# Patient Record
Sex: MALE | URBAN - METROPOLITAN AREA
[De-identification: names, ages, dates, MRNs, and addresses within clinical notes are randomized per-mention and may not be internally consistent; named-entity substitution may affect disease eponyms.]

---

## 2020-10-28 ENCOUNTER — APPOINTMENT (OUTPATIENT)
Dept: URBAN - METROPOLITAN AREA CLINIC 252 | Age: 50
Setting detail: DERMATOLOGY
End: 2020-10-29

## 2020-10-28 VITALS — WEIGHT: 270 LBS | HEIGHT: 72 IN | RESPIRATION RATE: 16 BRPM

## 2020-10-28 DIAGNOSIS — L57.0 ACTINIC KERATOSIS: ICD-10-CM

## 2020-10-28 DIAGNOSIS — L28.1 PRURIGO NODULARIS: ICD-10-CM

## 2020-10-28 DIAGNOSIS — D22 MELANOCYTIC NEVI: ICD-10-CM

## 2020-10-28 PROBLEM — D22.61 MELANOCYTIC NEVI OF RIGHT UPPER LIMB, INCLUDING SHOULDER: Status: ACTIVE | Noted: 2020-10-28

## 2020-10-28 PROBLEM — D48.5 NEOPLASM OF UNCERTAIN BEHAVIOR OF SKIN: Status: ACTIVE | Noted: 2020-10-28

## 2020-10-28 PROBLEM — D22.5 MELANOCYTIC NEVI OF TRUNK: Status: ACTIVE | Noted: 2020-10-28

## 2020-10-28 PROBLEM — D22.62 MELANOCYTIC NEVI OF LEFT UPPER LIMB, INCLUDING SHOULDER: Status: ACTIVE | Noted: 2020-10-28

## 2020-10-28 PROCEDURE — 88305 TISSUE EXAM BY PATHOLOGIST: CPT

## 2020-10-28 PROCEDURE — 11102 TANGNTL BX SKIN SINGLE LES: CPT

## 2020-10-28 PROCEDURE — OTHER LIQUID NITROGEN: OTHER

## 2020-10-28 PROCEDURE — OTHER BIOPSY BY SHAVE METHOD: OTHER

## 2020-10-28 PROCEDURE — 99203 OFFICE O/P NEW LOW 30 MIN: CPT | Mod: 25

## 2020-10-28 PROCEDURE — 17000 DESTRUCT PREMALG LESION: CPT | Mod: 59

## 2020-10-28 PROCEDURE — OTHER PATHOLOGY BILLING: OTHER

## 2020-10-28 PROCEDURE — OTHER COUNSELING: OTHER

## 2020-10-28 ASSESSMENT — LOCATION SIMPLE DESCRIPTION DERM
LOCATION SIMPLE: RIGHT SHOULDER
LOCATION SIMPLE: RIGHT UPPER BACK
LOCATION SIMPLE: LEFT UPPER ARM
LOCATION SIMPLE: TRAPEZIAL NECK
LOCATION SIMPLE: LEFT SHOULDER
LOCATION SIMPLE: LEFT CHEEK

## 2020-10-28 ASSESSMENT — LOCATION ZONE DERM
LOCATION ZONE: FACE
LOCATION ZONE: TRUNK
LOCATION ZONE: ARM
LOCATION ZONE: NECK

## 2020-10-28 ASSESSMENT — LOCATION DETAILED DESCRIPTION DERM
LOCATION DETAILED: RIGHT MID-UPPER BACK
LOCATION DETAILED: RIGHT SUPERIOR UPPER BACK
LOCATION DETAILED: LEFT POSTERIOR SHOULDER
LOCATION DETAILED: LEFT ANTERIOR PROXIMAL UPPER ARM
LOCATION DETAILED: MID TRAPEZIAL NECK
LOCATION DETAILED: LEFT SUPERIOR PREAURICULAR CHEEK
LOCATION DETAILED: RIGHT POSTERIOR SHOULDER

## 2020-10-28 NOTE — PROCEDURE: BIOPSY BY SHAVE METHOD
Type Of Destruction Used: Curettage
Hide Second Anesthesia?: No
Billing Type: Client Bill
Post-Care Instructions: I reviewed with the patient in detail post-care instructions. Patient is to keep the biopsy site dry overnight, and then apply bacitracin twice daily until healed. Patient may apply hydrogen peroxide soaks to remove any crusting.
Notification Instructions: Patient will be notified of biopsy results. However, patient instructed to call the office if not contacted within 2 weeks.
Depth Of Biopsy: dermis
Detail Level: Detailed
Wound Care: Petrolatum
Additional Anesthesia Volume In Cc (Will Not Render If 0): 0
Dressing: bandage
Information: Selecting Yes will display possible errors in your note based on the variables you have selected. This validation is only offered as a suggestion for you. PLEASE NOTE THAT THE VALIDATION TEXT WILL BE REMOVED WHEN YOU FINALIZE YOUR NOTE. IF YOU WANT TO FAX A PRELIMINARY NOTE YOU WILL NEED TO TOGGLE THIS TO 'NO' IF YOU DO NOT WANT IT IN YOUR FAXED NOTE.
Anesthesia Type: 1% lidocaine with epinephrine
Electrodesiccation And Curettage Text: The wound bed was treated with electrodesiccation and curettage after the biopsy was performed.
Biopsy Method: Dermablade
Consent: Written consent was obtained and risks were reviewed including but not limited to scarring, infection, bleeding, scabbing, incomplete removal, nerve damage and allergy to anesthesia.
Silver Nitrate Text: The wound bed was treated with silver nitrate after the biopsy was performed.
Anesthesia Volume In Cc (Will Not Render If 0): 0.5
Validate Note Data (See Information Below): Yes
Cryotherapy Text: The wound bed was treated with cryotherapy after the biopsy was performed.
Electrodesiccation Text: The wound bed was treated with electrodesiccation after the biopsy was performed.
Hemostasis: Drysol
Biopsy Type: H and E
Curettage Text: The wound bed was treated with curettage after the biopsy was performed.

## 2020-10-28 NOTE — PROCEDURE: PATHOLOGY BILLING
Immunohistochemistry ([id number] and [id number]) billing is not performed here. Please use the Immunohistochemistry Stain Billing plan to accomplish this. Immunohistochemistry ([id number] and [id number]) billing is not performed here. Please use the Immunohistochemistry Stain Billing plan to accomplish this.

## 2023-01-18 ENCOUNTER — TRANSFERRED RECORDS (OUTPATIENT)
Dept: HEALTH INFORMATION MANAGEMENT | Facility: CLINIC | Age: 53
End: 2023-01-18
Payer: COMMERCIAL

## 2023-01-20 ENCOUNTER — MEDICAL CORRESPONDENCE (OUTPATIENT)
Dept: HEALTH INFORMATION MANAGEMENT | Facility: CLINIC | Age: 53
End: 2023-01-20
Payer: COMMERCIAL

## 2023-01-23 ENCOUNTER — TRANSCRIBE ORDERS (OUTPATIENT)
Dept: OTHER | Age: 53
End: 2023-01-23

## 2023-01-23 DIAGNOSIS — G31.9 BRAIN ATROPHY (H): Primary | ICD-10-CM

## 2023-03-08 ENCOUNTER — APPOINTMENT (OUTPATIENT)
Dept: URBAN - METROPOLITAN AREA CLINIC 252 | Age: 53
Setting detail: DERMATOLOGY
End: 2023-03-12

## 2023-03-08 VITALS — WEIGHT: 250 LBS | RESPIRATION RATE: 18 BRPM | HEIGHT: 70 IN

## 2023-03-08 DIAGNOSIS — D485 NEOPLASM OF UNCERTAIN BEHAVIOR OF SKIN: ICD-10-CM

## 2023-03-08 DIAGNOSIS — D22 MELANOCYTIC NEVI: ICD-10-CM

## 2023-03-08 DIAGNOSIS — L81.4 OTHER MELANIN HYPERPIGMENTATION: ICD-10-CM

## 2023-03-08 DIAGNOSIS — L57.0 ACTINIC KERATOSIS: ICD-10-CM

## 2023-03-08 PROBLEM — D22.5 MELANOCYTIC NEVI OF TRUNK: Status: ACTIVE | Noted: 2023-03-08

## 2023-03-08 PROBLEM — D48.5 NEOPLASM OF UNCERTAIN BEHAVIOR OF SKIN: Status: ACTIVE | Noted: 2023-03-08

## 2023-03-08 PROBLEM — D23.72 OTHER BENIGN NEOPLASM OF SKIN OF LEFT LOWER LIMB, INCLUDING HIP: Status: ACTIVE | Noted: 2023-03-08

## 2023-03-08 PROBLEM — D22.61 MELANOCYTIC NEVI OF RIGHT UPPER LIMB, INCLUDING SHOULDER: Status: ACTIVE | Noted: 2023-03-08

## 2023-03-08 PROBLEM — D22.62 MELANOCYTIC NEVI OF LEFT UPPER LIMB, INCLUDING SHOULDER: Status: ACTIVE | Noted: 2023-03-08

## 2023-03-08 PROCEDURE — 99213 OFFICE O/P EST LOW 20 MIN: CPT | Mod: 25

## 2023-03-08 PROCEDURE — OTHER BIOPSY BY SHAVE METHOD: OTHER

## 2023-03-08 PROCEDURE — 11102 TANGNTL BX SKIN SINGLE LES: CPT

## 2023-03-08 PROCEDURE — OTHER COUNSELING: OTHER

## 2023-03-08 PROCEDURE — OTHER LIQUID NITROGEN: OTHER

## 2023-03-08 PROCEDURE — 17000 DESTRUCT PREMALG LESION: CPT | Mod: 59

## 2023-03-08 PROCEDURE — OTHER MIPS QUALITY: OTHER

## 2023-03-08 ASSESSMENT — LOCATION DETAILED DESCRIPTION DERM
LOCATION DETAILED: INFERIOR MID FOREHEAD
LOCATION DETAILED: LEFT POSTERIOR SHOULDER
LOCATION DETAILED: LEFT LATERAL MALAR CHEEK
LOCATION DETAILED: MID-OCCIPITAL SCALP
LOCATION DETAILED: RIGHT SUPERIOR UPPER BACK
LOCATION DETAILED: RIGHT LATERAL MALAR CHEEK
LOCATION DETAILED: RIGHT POSTERIOR SHOULDER
LOCATION DETAILED: RIGHT MID-UPPER BACK

## 2023-03-08 ASSESSMENT — LOCATION ZONE DERM
LOCATION ZONE: FACE
LOCATION ZONE: ARM
LOCATION ZONE: TRUNK
LOCATION ZONE: SCALP

## 2023-03-08 ASSESSMENT — LOCATION SIMPLE DESCRIPTION DERM
LOCATION SIMPLE: RIGHT UPPER BACK
LOCATION SIMPLE: INFERIOR FOREHEAD
LOCATION SIMPLE: LEFT CHEEK
LOCATION SIMPLE: POSTERIOR SCALP
LOCATION SIMPLE: RIGHT CHEEK
LOCATION SIMPLE: LEFT SHOULDER
LOCATION SIMPLE: RIGHT SHOULDER

## 2023-03-08 NOTE — PROCEDURE: BIOPSY BY SHAVE METHOD
Dressing: bandage
Biopsy Method: Dermablade
Anesthesia Type: 1% lidocaine with epinephrine
Notification Instructions: Patient will be notified of biopsy results. However, patient instructed to call the office if not contacted within 2 weeks.
Validate Note Data (See Information Below): Yes
Biopsy Type: H and E
Wound Care: Petrolatum
Curettage Text: The wound bed was treated with curettage after the biopsy was performed.
X Size Of Lesion In Cm: 0
Hide Additional Size Dimension?: No
Hemostasis: Drysol
Electrodesiccation Text: The wound bed was treated with electrodesiccation after the biopsy was performed.
Information: Selecting Yes will display possible errors in your note based on the variables you have selected. This validation is only offered as a suggestion for you. PLEASE NOTE THAT THE VALIDATION TEXT WILL BE REMOVED WHEN YOU FINALIZE YOUR NOTE. IF YOU WANT TO FAX A PRELIMINARY NOTE YOU WILL NEED TO TOGGLE THIS TO 'NO' IF YOU DO NOT WANT IT IN YOUR FAXED NOTE.
Billing Type: Client Bill
Silver Nitrate Text: The wound bed was treated with silver nitrate after the biopsy was performed.
Post-Care Instructions: I reviewed with the patient in detail post-care instructions. Patient is to keep the biopsy site dry overnight, and then apply bacitracin twice daily until healed. Patient may apply hydrogen peroxide soaks to remove any crusting.
Consent: Written consent was obtained and risks were reviewed including but not limited to scarring, infection, bleeding, scabbing, incomplete removal, nerve damage and allergy to anesthesia.
Depth Of Biopsy: dermis
Type Of Destruction Used: Curettage
Cryotherapy Text: The wound bed was treated with cryotherapy after the biopsy was performed.
Anesthesia Volume In Cc (Will Not Render If 0): 0.5
Detail Level: Detailed
Electrodesiccation And Curettage Text: The wound bed was treated with electrodesiccation and curettage after the biopsy was performed.

## 2023-04-10 ENCOUNTER — OFFICE VISIT (OUTPATIENT)
Dept: NEUROLOGY | Facility: CLINIC | Age: 53
End: 2023-04-10
Payer: COMMERCIAL

## 2023-04-10 ENCOUNTER — TELEPHONE (OUTPATIENT)
Dept: NEUROPSYCHOLOGY | Facility: CLINIC | Age: 53
End: 2023-04-10

## 2023-04-10 VITALS — WEIGHT: 269.4 LBS

## 2023-04-10 DIAGNOSIS — G31.9 BRAIN ATROPHY (H): ICD-10-CM

## 2023-04-10 DIAGNOSIS — R41.3 MEMORY PROBLEM: ICD-10-CM

## 2023-04-10 DIAGNOSIS — R90.89 ABNORMAL BRAIN MRI: Primary | ICD-10-CM

## 2023-04-10 PROCEDURE — 99205 OFFICE O/P NEW HI 60 MIN: CPT | Performed by: PSYCHIATRY & NEUROLOGY

## 2023-04-10 RX ORDER — SEMAGLUTIDE 1.34 MG/ML
INJECTION, SOLUTION SUBCUTANEOUS
COMMUNITY
Start: 2023-01-18

## 2023-04-10 RX ORDER — HYDROCHLOROTHIAZIDE 25 MG/1
25 TABLET ORAL DAILY
COMMUNITY

## 2023-04-10 RX ORDER — ATENOLOL 100 MG/1
100 TABLET ORAL DAILY
COMMUNITY

## 2023-04-10 RX ORDER — ROSUVASTATIN CALCIUM 40 MG/1
1 TABLET, COATED ORAL
COMMUNITY
Start: 2023-03-06

## 2023-04-10 RX ORDER — AMLODIPINE BESYLATE 5 MG/1
1 TABLET ORAL
COMMUNITY

## 2023-04-10 RX ORDER — LISINOPRIL 40 MG/1
40 TABLET ORAL DAILY
COMMUNITY

## 2023-04-10 ASSESSMENT — MONTREAL COGNITIVE ASSESSMENT (MOCA)
WHAT LEVEL OF EDUCATION WAS ATTAINED: 0
8. SERIAL SUBTRACTION OF 7S: 3
12. MEMORY INDEX SCORE: 5
13. ORIENTATION SUBSCORE: 6
12. MEMORY INDEX SCORE: 5
10. [FLUENCY] NAME WORDS STARTING WITH DESIGNATED LETTER: 1
7. [VIGILENCE] TAP WHEN HEARING DESIGNATED LETTER: 1
VISUOSPATIAL/EXECUTIVE SUBSCORE: 5
10. [FLUENCY] NAME WORDS STARTING WITH DESIGNATED LETTER: 1
WHAT LEVEL OF EDUCATION WAS ATTAINED: 0
6. READ LIST OF DIGITS [FORWARD/BACKWARD]: 2
4. NAME EACH OF THE THREE ANIMALS SHOWN: 3
WHAT IS THE TOTAL SCORE (OUT OF 30): 30
9. REPEAT EACH SENTENCE: 2
9. REPEAT EACH SENTENCE: 2
13. ORIENTATION SUBSCORE: 6
6. READ LIST OF DIGITS [FORWARD/BACKWARD]: 2
4. NAME EACH OF THE THREE ANIMALS SHOWN: 3
7. [VIGILENCE] TAP WHEN HEARING DESIGNATED LETTER: 1
8. SERIAL SUBTRACTION OF 7S: 3
11. FOR EACH PAIR OF WORDS, WHAT CATEGORY DO THEY BELONG TO (OUT OF 2): 2
11. FOR EACH PAIR OF WORDS, WHAT CATEGORY DO THEY BELONG TO (OUT OF 2): 2
WHAT IS THE TOTAL SCORE (OUT OF 30): 30
VISUOSPATIAL/EXECUTIVE SUBSCORE: 5

## 2023-04-10 NOTE — PROGRESS NOTES
"INITIAL NEUROLOGY CONSULTATION    DATE OF VISIT: 4/10/2023  MRN: [id number]  PATIENT NAME: Darwin Gomez  YOB: 1970    REFERRING PROVIDER: Provider Not In System    Chief Complaint   Patient presents with     Memory Loss       SUBJECTIVE:                                                      HPI:   Darwin Gomez is a 52 year old male whom I have been asked by Dr. Melgoza to see in consultation for memory concerns.  Per chart review, he had a whole-body MRI screening completed and the brain MRI showed parietal lobe atrophy.  He has done \"23 and me\" DNA testing and apparently had normal APOB mutations.  He has additional history of goiter, prior thyroid biopsy which was benign.  He was sent to endocrine for consultation regarding this.  He also has history of gout, hypertension, hyperlipidemia and obesity.    He says he had the full body scan for his own interest.  No history of cancer.  The scan was completed in September 2022.  He does have a printout of some of the pictures which show significant bilateral parietal atrophy. He also brought a disc of the images.    He has noticed some problems with memory since ~2018. He has trouble with recalling facts, though things typically will come to him if he thinks about it for a while.  He says he has never been good with faces. He has been having trouble with writing, specifically he says he used to be able to write with more complexity.  The flow of his writing is not as good as it used to be.  He finds himself double checking. Occasionally word-finding difficulties. He has trouble with complex conversations.    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 He does not think the above issues are getting in the way of work. He does have to double check his work though.    Darwin completed a bachelor's degree and then did a couple more years of college, taking premedical courses.  He thought about going into medicine but started to do more work with " computers and took a different direction in his career.  His father is an ER physician, still teaches at [employer].    No history of early onset dementia in the family.    Paternal grandfather  age 69, had colon cancer, abdominal cancer and prostate cancer.  Mother is alive and has sleep apnea, type 2 diabetes, breast cancer.  Father is alive with prostate cancer.     He does have sleep apnea, uses his CPAP and generally sleeps well.  He wakes well rested.  Mood is generally good though he was a little disheartened by the findings on his MRI.  I ask about his thyroid and he says he is having the thyroid removed in a couple of weeks.  Thyroid function has been okay but his goiter has enlarged to the point where it is actually pushing his trachea and esophagus to the side.    He has noticed a little bit of trouble with fast movement visually.  He has seen his optometrist in the past year, but plans to also be evaluated by ophthalmology.    He is  with 2 children.  Family and friends have not necessarily noticed any issues with his memory.    No past medical history on file.  No past surgical history on file.    amLODIPine (NORVASC) 5 MG tablet, Take 1 tablet by mouth  aspirin (ASA) 81 MG EC tablet, Take 162 mg by mouth  atenolol (TENORMIN) 100 MG tablet, Take 100 mg by mouth daily  hydrochlorothiazide (HYDRODIURIL) 25 MG tablet, Take 25 mg by mouth daily  lisinopril (ZESTRIL) 40 MG tablet, Take 40 mg by mouth daily  metFORMIN (GLUCOPHAGE) 500 MG tablet, Take 1 tablet by mouth 2 times daily (with meals)  Multiple Vitamin (MULTIVITAMINS PO),   rosuvastatin (CRESTOR) 40 MG tablet, Take 1 tablet by mouth  semaglutide (OZEMPIC, 0.25 OR 0.5 MG/DOSE,) 2 MG/1.5ML SOPN pen, PATIENT DEMAND ONLY.    No current facility-administered medications on file prior to visit.    No Known Allergies     No data available        Social History     Tobacco Use     Smoking status: Never     Smokeless tobacco: Never   Substance  Use Topics     Alcohol use: Yes       REVIEW OF SYSTEMS:                                                      10-point review of systems is negative except as mentioned above in HPI.     EXAM:                                                      Physical Exam:   Vitals: Wt 122.2 kg (269 lb 6.4 oz)   BMI= There is no height or weight on file to calculate BMI.  GENERAL: NAD.  HEENT: NC/AT.   CV: RRR. S1, S2.   NECK: No bruits.  PULM: Non-labored breathing.   Neurologic:  MENTAL STATUS: Alert, attentive. Speech is fluent. Normal comprehension. MoCA: 30/30 (normal is 26 and above). Normal concentration. Adequate fund of knowledge.   CRANIAL NERVES: Visual fields intact to confrontation. Pupils equally, round and reactive to light. Facial sensation and movement normal. EOM full. Hearing intact to conversation. Trapezius strength intact. Palate moves symmetrically. Tongue midline.  MOTOR: 5/5 in proximal and distal muscle groups of upper and lower extremities. Tone and bulk normal.   DTRs: Intact and symmetric in biceps, BR, patellae.  Babinski down-going bilaterally.   SENSATION: Normal light touch and pinprick. Intact proprioception at great toes. Vibration: Normal at both ankles.   COORDINATION: Normal finger nose finger. Finger tapping normal. Knee heel shin normal.  STATION AND GAIT: Romberg Negative. Good postural reflexes. Casual gait is normal.  Right hand-dominant.    Relevant Data:  Will get his images scanned into the chart for my review.    ASSESSMENT and PLAN:                                                      Assessment:     ICD-10-CM    1. Abnormal brain MRI  R90.89 PET Brain     Adult Neuropsychology Referral      2. Brain atrophy (H)  G31.9 Adult Neurology  Referral      3. Memory problem  R41.3 Vitamin B12     PET Brain     Methylmalonic Acid     Adult Neuropsychology Referral          Mr. Gomez is a pleasant 52-year-old man here to talk about memory and a recent brain MRI.  He does have  significant parietal atrophy, out of proportion with the rest of his brain and extensive for age.  I would like to see the rest of his images so we will get those loaded into the system.  In the meantime, Darwin also requested a PET scan and is willing to pay for this out-of-pocket if need be.  I think this is a reasonable request given the incontinuity between his current cognitive function and the imaging.  He scored perfectly on the MoCA today.  I explained to Darwin that this test is only 90% sensitive, especially for mild cases of cognitive impairment so I recommend further testing through neuropsych.  We will also check his B12 and MMA.  We will plan to follow-up after the additional evaluations are completed.  Darwin understands and agrees with the plan.    Plan:  -- I would like to check your vitamin B12 level and MMA.  These are important for brain health.  -- Referral to neuropsych for formal cognitive evaluation.  -- PET scan ordered.  Hopefully your insurance will cover this.  -- Return to neurology after the above tests are completed to review results and develop a treatment plan.  -- Good luck with your upcoming surgery!    Total Time: 60 minutes were spent with the patient and in chart review/documentation (as described above in Assessment and Plan) /coordinating the care on date of service.    Yesica Cunningham MD  Neurology    CC: DO Irina Vargas software used in the dictation of this note.    5/23/2023  ADDENDUM: PET scan of Darwin's brain shows evidence of possible Alzheimer's.  He is interested in research opportunities so I am going to refer him to [facility] for additional evaluation. - TK

## 2023-04-10 NOTE — TELEPHONE ENCOUNTER
Spoke with patient. Sara mcclain 10/16 at 12:30pm with Dr. Champion at the [facility].    Luis Angel Santamaria on 4/10/2023 at 5:06 PM

## 2023-04-10 NOTE — PATIENT INSTRUCTIONS
Plan:  -- I would like to check your vitamin B12 level and MMA.  These are important for brain health.  -- Referral to neuropsych for formal cognitive evaluation.  -- PET scan ordered.  Hopefully your insurance will cover this.  -- Return to neurology after the above tests are completed to review results and develop a treatment plan.  -- Good luck with your upcoming surgery!

## 2023-04-10 NOTE — TELEPHONE ENCOUNTER
[facility] Call Center    Phone Message    May a detailed message be left on voicemail: yes     Reason for Call: Other: Patient calling to schedule Neuro Psychology evaluation.  Referral from  Amado BLACKWELL.  Please call patient to schedule    Action Taken: Message routed to:  [facility]    Travel Screening: Not Applicable

## 2023-04-10 NOTE — LETTER
"    4/10/2023         RE: Darwin Gomez  [address]  [place] [address]        Dear Colleague,    Thank you for referring your patient, Darwin Gomez, to the [facility]. Please see a copy of my visit note below.    INITIAL NEUROLOGY CONSULTATION    DATE OF VISIT: 4/10/2023  MRN: [id number]  PATIENT NAME: Darwin Gomez  YOB: 1970    REFERRING PROVIDER: Provider Not In System    Chief Complaint   Patient presents with     Memory Loss       SUBJECTIVE:                                                      HPI:   Darwin Gomez is a 52 year old male whom I have been asked by Dr. Melgoza to see in consultation for memory concerns.  Per chart review, he had a whole-body MRI screening completed and the brain MRI showed parietal lobe atrophy.  He has done \"23 and me\" DNA testing and apparently had normal APOB mutations.  He has additional history of goiter, prior thyroid biopsy which was benign.  He was sent to endocrine for consultation regarding this.  He also has history of gout, hypertension, hyperlipidemia and obesity.    He says he had the full body scan for his own interest.  No history of cancer.  The scan was completed in September 2022.  He does have a printout of some of the pictures which show significant bilateral parietal atrophy. He also brought a disc of the images.    He has noticed some problems with memory since ~2018. He has trouble with recalling facts, though things typically will come to him if he thinks about it for a while.  He says he has never been good with faces. He has been having trouble with writing, specifically he says he used to be able to write with more complexity.  The flow of his writing is not as good as it used to be.  He finds himself double checking. Occasionally word-finding difficulties. He has trouble with complex conversations.    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 He does not think the above issues are " getting in the way of work. He does have to double check his work though.    Darwin completed a bachelor's degree and then did a couple more years of college, taking premedical courses.  He thought about going into medicine but started to do more work with computers and took a different direction in his career.  His father is an ER physician, still teaches at [employer].    No history of early onset dementia in the family.    Paternal grandfather  age 69, had colon cancer, abdominal cancer and prostate cancer.  Mother is alive and has sleep apnea, type 2 diabetes, breast cancer.  Father is alive with prostate cancer.     He does have sleep apnea, uses his CPAP and generally sleeps well.  He wakes well rested.  Mood is generally good though he was a little disheartened by the findings on his MRI.  I ask about his thyroid and he says he is having the thyroid removed in a couple of weeks.  Thyroid function has been okay but his goiter has enlarged to the point where it is actually pushing his trachea and esophagus to the side.    He has noticed a little bit of trouble with fast movement visually.  He has seen his optometrist in the past year, but plans to also be evaluated by ophthalmology.    He is  with 2 children.  Family and friends have not necessarily noticed any issues with his memory.    No past medical history on file.  No past surgical history on file.    amLODIPine (NORVASC) 5 MG tablet, Take 1 tablet by mouth  aspirin (ASA) 81 MG EC tablet, Take 162 mg by mouth  atenolol (TENORMIN) 100 MG tablet, Take 100 mg by mouth daily  hydrochlorothiazide (HYDRODIURIL) 25 MG tablet, Take 25 mg by mouth daily  lisinopril (ZESTRIL) 40 MG tablet, Take 40 mg by mouth daily  metFORMIN (GLUCOPHAGE) 500 MG tablet, Take 1 tablet by mouth 2 times daily (with meals)  Multiple Vitamin (MULTIVITAMINS PO),   rosuvastatin (CRESTOR) 40 MG tablet, Take 1 tablet by mouth  semaglutide (OZEMPIC, 0.25 OR 0.5 MG/DOSE,) 2  MG/1.5ML SOPN pen, PATIENT DEMAND ONLY.    No current facility-administered medications on file prior to visit.    No Known Allergies     No data available        Social History     Tobacco Use     Smoking status: Never     Smokeless tobacco: Never   Substance Use Topics     Alcohol use: Yes       REVIEW OF SYSTEMS:                                                      10-point review of systems is negative except as mentioned above in HPI.     EXAM:                                                      Physical Exam:   Vitals: Wt 122.2 kg (269 lb 6.4 oz)   BMI= There is no height or weight on file to calculate BMI.  GENERAL: NAD.  HEENT: NC/AT.   CV: RRR. S1, S2.   NECK: No bruits.  PULM: Non-labored breathing.   Neurologic:  MENTAL STATUS: Alert, attentive. Speech is fluent. Normal comprehension. MoCA: 30/30 (normal is 26 and above). Normal concentration. Adequate fund of knowledge.   CRANIAL NERVES: Visual fields intact to confrontation. Pupils equally, round and reactive to light. Facial sensation and movement normal. EOM full. Hearing intact to conversation. Trapezius strength intact. Palate moves symmetrically. Tongue midline.  MOTOR: 5/5 in proximal and distal muscle groups of upper and lower extremities. Tone and bulk normal.   DTRs: Intact and symmetric in biceps, BR, patellae.  Babinski down-going bilaterally.   SENSATION: Normal light touch and pinprick. Intact proprioception at great toes. Vibration: Normal at both ankles.   COORDINATION: Normal finger nose finger. Finger tapping normal. Knee heel shin normal.  STATION AND GAIT: Romberg Negative. Good postural reflexes. Casual gait is normal.  Right hand-dominant.    Relevant Data:  Will get his images scanned into the chart for my review.    ASSESSMENT and PLAN:                                                      Assessment:     ICD-10-CM    1. Abnormal brain MRI  R90.89 PET Brain     Adult Neuropsychology Referral      2. Brain atrophy (H)  G31.9 Adult  Neurology  Referral      3. Memory problem  R41.3 Vitamin B12     PET Brain     Methylmalonic Acid     Adult Neuropsychology Referral          Mr. Gomze is a pleasant 52-year-old man here to talk about memory and a recent brain MRI.  He does have significant parietal atrophy, out of proportion with the rest of his brain and extensive for age.  I would like to see the rest of his images so we will get those loaded into the system.  In the meantime, Darwin also requested a PET scan and is willing to pay for this out-of-pocket if need be.  I think this is a reasonable request given the incontinuity between his current cognitive function and the imaging.  He scored perfectly on the MoCA today.  I explained to Darwin that this test is only 90% sensitive, especially for mild cases of cognitive impairment so I recommend further testing through neuropsych.  We will also check his B12 and MMA.  We will plan to follow-up after the additional evaluations are completed.  Darwin understands and agrees with the plan.    Plan:  -- I would like to check your vitamin B12 level and MMA.  These are important for brain health.  -- Referral to neuropsych for formal cognitive evaluation.  -- PET scan ordered.  Hopefully your insurance will cover this.  -- Return to neurology after the above tests are completed to review results and develop a treatment plan.  -- Good luck with your upcoming surgery!    Total Time: 60 minutes were spent with the patient and in chart review/documentation (as described above in Assessment and Plan) /coordinating the care on date of service.    Yesica Cunningham MD  Neurology    CC: DO Jason Vargason software used in the dictation of this note.        Again, thank you for allowing me to participate in the care of your patient.        Sincerely,        Yesica Cunningham MD

## 2023-04-10 NOTE — NURSING NOTE
AKASH COGNITIVE ASSESSMENT (MOCA)  Version 7.1 Original Version  VISUOSPATIAL/EXECUTIVE               COPY CUBE      [ 1   ]                                [ 1   ] DRAW CLOCK (Ten past eleven)  (3 points)    [  1  ]                    [ 1   ]               [  1  ]       Contour            Numbers     Hands POINTS               5    / 5   NAMING    [  1 ]                                                                        [  1  ]                                             [  1  ]  Licheng Sommer                                Camel                   3  / 3   MEMORY Read list of words, subject must repeat them. Do 2 trials, even if 1st trial is successful. Do a recall after 5 minutes  FACE VELVET Tenriism BETTY RED No Points    1st          2nd         ATTENTION Read list of digits (1 digit/sec) Subject has to repeat in the forward order       [ 1   ]   2  1  8  5  4                                [ 1   ] 7 4 2                      2    /2   Read list of letters. The subject must tap with his hand at each letter A. No points if > 2 errors.  [  1  ] F B A C M N A A J K L B A F A K D E A A A J A M O F A A B            1  /1   Serial 7 subtraction starting at 100          [ 1   ] 93         [  1  ] 86          [ 1   ] 79          [  1  ] 72         [  1  ] 65   4 or 5 correct subtractions: 3 points,  2 or 3 correct: 2 points,  1correct: 1 point,   0 correct: 0 points         3   /3   LANGUAGE Repeat: I only know that Jayden is the one to help today. [  1  ]                                      The cat always hid under the couch when dogs were in the room. [  1 ]            2   /2   Fluency: Name maximum number of words in one minute that begin with the letter F                                                                                                                    [ 1   ] ___ (N > 11 words)             1 /1   ABSTRACTION Similarity  between e.g. banana-orange=fruit                                                                   [  1  ] train-bicycle                      [  1  ] watch-ruler        2      /2   DELAYED  RECALL Has to recall words  WITH NO CUE FACE  [ 1   ] VELVET  [ 1   ] Mormonism  [ 1   ]  BETTY  [  1  ] RED  [ 1   ] Points for UNCUED recall only       5     /5           OPTIONAL Category cue           Multiple choice cue          ORIENTATION  [  1  ] Date     [  1  ] Month       [  1  ] Year      [ 1   ] Day      [ 1   ] Place        [    1] City     6 /6   TOTAL  Normal > 26/30 0Add 1 point if < 12 years education      30 /30

## 2023-04-11 ENCOUNTER — LAB (OUTPATIENT)
Dept: LAB | Facility: HOSPITAL | Age: 53
End: 2023-04-11
Payer: COMMERCIAL

## 2023-04-11 DIAGNOSIS — R41.3 MEMORY PROBLEM: ICD-10-CM

## 2023-04-11 LAB — VIT B12 SERPL-MCNC: 295 PG/ML (ref 232–1245)

## 2023-04-11 PROCEDURE — 83921 ORGANIC ACID SINGLE QUANT: CPT

## 2023-04-11 PROCEDURE — 36415 COLL VENOUS BLD VENIPUNCTURE: CPT

## 2023-04-11 PROCEDURE — 82607 VITAMIN B-12: CPT

## 2023-04-13 LAB — METHYLMALONATE SERPL-SCNC: 0.29 UMOL/L (ref 0–0.4)

## 2023-04-30 ENCOUNTER — HEALTH MAINTENANCE LETTER (OUTPATIENT)
Age: 53
End: 2023-04-30

## 2023-05-05 ENCOUNTER — HOSPITAL ENCOUNTER (OUTPATIENT)
Dept: PET IMAGING | Facility: HOSPITAL | Age: 53
Discharge: HOME | End: 2023-05-05
Attending: PSYCHIATRY & NEUROLOGY | Admitting: PSYCHIATRY & NEUROLOGY
Payer: COMMERCIAL

## 2023-05-05 DIAGNOSIS — R90.89 ABNORMAL BRAIN MRI: ICD-10-CM

## 2023-05-05 DIAGNOSIS — R41.3 MEMORY PROBLEM: ICD-10-CM

## 2023-05-05 LAB — GLUCOSE BLDC GLUCOMTR-MCNC: 90 MG/DL (ref 70–99)

## 2023-05-05 PROCEDURE — A9552 F18 FDG: HCPCS | Performed by: PSYCHIATRY & NEUROLOGY

## 2023-05-05 PROCEDURE — 82962 GLUCOSE BLOOD TEST: CPT

## 2023-05-05 PROCEDURE — 343N000001 HC RX 343: Performed by: PSYCHIATRY & NEUROLOGY

## 2023-05-05 PROCEDURE — 78608 BRAIN IMAGING (PET): CPT | Mod: PI

## 2023-05-05 RX ADMIN — FLUDEOXYGLUCOSE F-18 10.22 MILLICURIE: 500 INJECTION, SOLUTION INTRAVENOUS at 12:35

## 2023-05-31 ENCOUNTER — PRE VISIT (OUTPATIENT)
Dept: NEUROLOGY | Facility: CLINIC | Age: 53
End: 2023-05-31

## 2023-07-03 ENCOUNTER — TELEPHONE (OUTPATIENT)
Dept: CARDIOLOGY | Facility: CLINIC | Age: 53
End: 2023-07-03
Payer: COMMERCIAL

## 2023-07-03 NOTE — TELEPHONE ENCOUNTER
[facility] Call Center    Phone Message    May a detailed message be left on voicemail: yes     Reason for Call: Other: Patient submitted online appointment request via website to see Pulmonary Hypertension provider for Pulmonary Arterial Hypertension diagnosis and treatment . Please contact patient for scheduling     Action Taken: Message routed to:  [facility]    Travel Screening: Not Applicable                                                          Thank you!  [facility]

## 2023-07-06 ENCOUNTER — PRE VISIT (OUTPATIENT)
Dept: CARDIOLOGY | Facility: CLINIC | Age: 53
End: 2023-07-06
Payer: COMMERCIAL

## 2023-07-06 NOTE — TELEPHONE ENCOUNTER
RECORDS RECEIVED FROM:    DATE RECEIVED:    GENERAL RECORDS STATUS DETAILS   OFFICE NOTE from cardiologists N/A    LABS Care Everywhere    EKG (STRIPS & REPORTS) N/A    ECHOS (IMAGES AND REPORTS) N/A    PULMONARY HYPERTENSION      6 MINUTE WALK TEST N/A    PULMONARY FUNCTION TESTS N/A    RIGHT HEART CATH (IMAGES) N/A    SLEEP STUDY / OVERNIGHT OXIMETRY N/A    XR CHEST   (IMAGES AND REPORTS) N/A    CHEST CT  (IMAGES AND REPORTS) Received  Received 3-16-23  6-12-23 Cardiac CT   V/Q SCAN (IMAGES) N/A    LIVER US  (IMAGES AND REPORTS) N/A    ANGIOGRAMS (IMAGES) N/A    STRESS TEST   (IMAGES AND REPORTS) N/A      Action 7/6/23 [facility] Request   Action Taken   ? CT Chest   ? 3-16-23     LVM for pt to inquire any other applicable testing/ records.       Action Moira Mitchell on 7/7/2023     Action Taken   2nd attempt to contact patient. LVM    Received call back and verbal authorization from pt.     No applicable records or testing.    Mentioned calcium test showed enlarged aorta- reasoning for requested apt.    CTC resolved in PACS.    Cardiac CT resolved in PACS.

## 2023-07-07 NOTE — TELEPHONE ENCOUNTER
Called and spoke with patient and he stated he is going to try [facility] since they got him schedule already , I did tell the patient if he wants to do testing and see us here at the [facility] sooner he can give me a call otherwise he'll be at the [facility] for care.     Maureen Bird  [facility]    Pulmonary Hypertension   [facility]  (P) [phone]

## 2023-07-07 NOTE — TELEPHONE ENCOUNTER
Called patient and LVM. I called [facility] to get records and see if the patient is scheduled and he has 13 testing/ appts set up for Sept and to see a PH dr. I wanted to confirm that he wanted to stay at the [facility] and not come to this location.       Maureen Bird  [facility]    Pulmonary Hypertension   [facility]  P) [phone]

## 2024-07-07 ENCOUNTER — HEALTH MAINTENANCE LETTER (OUTPATIENT)
Age: 54
End: 2024-07-07

## 2024-07-10 ENCOUNTER — APPOINTMENT (OUTPATIENT)
Dept: URBAN - METROPOLITAN AREA CLINIC 252 | Age: 54
Setting detail: DERMATOLOGY
End: 2024-07-13

## 2024-07-10 VITALS — WEIGHT: 250 LBS | HEIGHT: 72 IN | RESPIRATION RATE: 18 BRPM

## 2024-07-10 DIAGNOSIS — D22 MELANOCYTIC NEVI: ICD-10-CM

## 2024-07-10 DIAGNOSIS — L81.4 OTHER MELANIN HYPERPIGMENTATION: ICD-10-CM

## 2024-07-10 DIAGNOSIS — L57.8 OTHER SKIN CHANGES DUE TO CHRONIC EXPOSURE TO NONIONIZING RADIATION: ICD-10-CM

## 2024-07-10 PROBLEM — D22.5 MELANOCYTIC NEVI OF TRUNK: Status: ACTIVE | Noted: 2024-07-10

## 2024-07-10 PROBLEM — D22.39 MELANOCYTIC NEVI OF OTHER PARTS OF FACE: Status: ACTIVE | Noted: 2024-07-10

## 2024-07-10 PROBLEM — D48.5 NEOPLASM OF UNCERTAIN BEHAVIOR OF SKIN: Status: ACTIVE | Noted: 2024-07-10

## 2024-07-10 PROBLEM — D23.72 OTHER BENIGN NEOPLASM OF SKIN OF LEFT LOWER LIMB, INCLUDING HIP: Status: ACTIVE | Noted: 2024-07-10

## 2024-07-10 PROCEDURE — 99213 OFFICE O/P EST LOW 20 MIN: CPT | Mod: 25

## 2024-07-10 PROCEDURE — 11103 TANGNTL BX SKIN EA SEP/ADDL: CPT

## 2024-07-10 PROCEDURE — 11102 TANGNTL BX SKIN SINGLE LES: CPT

## 2024-07-10 PROCEDURE — OTHER COUNSELING: OTHER

## 2024-07-10 PROCEDURE — OTHER BIOPSY BY SHAVE METHOD: OTHER

## 2024-07-10 ASSESSMENT — LOCATION ZONE DERM
LOCATION ZONE: SCALP
LOCATION ZONE: FACE
LOCATION ZONE: ARM
LOCATION ZONE: TRUNK

## 2024-07-10 ASSESSMENT — LOCATION DETAILED DESCRIPTION DERM
LOCATION DETAILED: LEFT SUPERIOR LATERAL FOREHEAD
LOCATION DETAILED: RIGHT POSTERIOR SHOULDER
LOCATION DETAILED: RIGHT SUPERIOR UPPER BACK
LOCATION DETAILED: INFERIOR THORACIC SPINE
LOCATION DETAILED: RIGHT ANTERIOR PROXIMAL UPPER ARM
LOCATION DETAILED: LEFT CENTRAL MALAR CHEEK
LOCATION DETAILED: RIGHT LATERAL MALAR CHEEK
LOCATION DETAILED: LEFT SUPERIOR LATERAL UPPER BACK
LOCATION DETAILED: LEFT CENTRAL FRONTAL SCALP

## 2024-07-10 ASSESSMENT — LOCATION SIMPLE DESCRIPTION DERM
LOCATION SIMPLE: UPPER BACK
LOCATION SIMPLE: SCALP
LOCATION SIMPLE: RIGHT UPPER ARM
LOCATION SIMPLE: RIGHT SHOULDER
LOCATION SIMPLE: LEFT CHEEK
LOCATION SIMPLE: LEFT UPPER BACK
LOCATION SIMPLE: RIGHT UPPER BACK
LOCATION SIMPLE: LEFT FOREHEAD
LOCATION SIMPLE: RIGHT CHEEK

## 2024-07-10 NOTE — PROCEDURE: BIOPSY BY SHAVE METHOD
Detail Level: Detailed
Depth Of Biopsy: dermis
Was A Bandage Applied: Yes
Size Of Lesion In Cm: 0
Biopsy Type: H and E
Biopsy Method: Dermablade
Anesthesia Type: 1% lidocaine with epinephrine
Anesthesia Volume In Cc: 0.5
Hemostasis: Drysol
Wound Care: Petrolatum
Dressing: bandage
Destruction After The Procedure: No
Type Of Destruction Used: Curettage
Curettage Text: The wound bed was treated with curettage after the biopsy was performed.
Cryotherapy Text: The wound bed was treated with cryotherapy after the biopsy was performed.
Electrodesiccation Text: The wound bed was treated with electrodesiccation after the biopsy was performed.
Electrodesiccation And Curettage Text: The wound bed was treated with electrodesiccation and curettage after the biopsy was performed.
Silver Nitrate Text: The wound bed was treated with silver nitrate after the biopsy was performed.
Lab: -[id number]
Consent: Written consent was obtained and risks were reviewed including but not limited to scarring, infection, bleeding, scabbing, incomplete removal, nerve damage and allergy to anesthesia.
Post-Care Instructions: I reviewed with the patient in detail post-care instructions. Patient is to keep the biopsy site dry overnight, and then apply bacitracin twice daily until healed. Patient may apply hydrogen peroxide soaks to remove any crusting.
Notification Instructions: Patient will be notified of biopsy results. However, patient instructed to call the office if not contacted within 2 weeks.
Billing Type: Third-Party Bill
Information: Selecting Yes will display possible errors in your note based on the variables you have selected. This validation is only offered as a suggestion for you. PLEASE NOTE THAT THE VALIDATION TEXT WILL BE REMOVED WHEN YOU FINALIZE YOUR NOTE. IF YOU WANT TO FAX A PRELIMINARY NOTE YOU WILL NEED TO TOGGLE THIS TO 'NO' IF YOU DO NOT WANT IT IN YOUR FAXED NOTE.

## 2024-08-05 ENCOUNTER — APPOINTMENT (OUTPATIENT)
Dept: URBAN - METROPOLITAN AREA CLINIC 259 | Age: 54
Setting detail: DERMATOLOGY
End: 2024-08-06

## 2024-08-05 DIAGNOSIS — D22 MELANOCYTIC NEVI: ICD-10-CM

## 2024-08-05 PROBLEM — D22.39 MELANOCYTIC NEVI OF OTHER PARTS OF FACE: Status: ACTIVE | Noted: 2024-08-05

## 2024-08-05 PROCEDURE — 11442 EXC FACE-MM B9+MARG 1.1-2 CM: CPT

## 2024-08-05 PROCEDURE — OTHER COUNSELING: OTHER

## 2024-08-05 PROCEDURE — OTHER MIPS QUALITY: OTHER

## 2024-08-05 PROCEDURE — 12052 INTMD RPR FACE/MM 2.6-5.0 CM: CPT

## 2024-08-05 PROCEDURE — OTHER EXCISION: OTHER

## 2024-08-05 ASSESSMENT — LOCATION SIMPLE DESCRIPTION DERM: LOCATION SIMPLE: LEFT FOREHEAD

## 2024-08-05 ASSESSMENT — LOCATION ZONE DERM: LOCATION ZONE: FACE

## 2024-08-05 ASSESSMENT — LOCATION DETAILED DESCRIPTION DERM: LOCATION DETAILED: LEFT SUPERIOR FOREHEAD

## 2024-08-05 NOTE — PROCEDURE: EXCISION

## 2024-08-20 ENCOUNTER — APPOINTMENT (OUTPATIENT)
Dept: URBAN - METROPOLITAN AREA CLINIC 259 | Age: 54
Setting detail: DERMATOLOGY
End: 2024-08-20

## 2024-08-20 DIAGNOSIS — Z48.02 ENCOUNTER FOR REMOVAL OF SUTURES: ICD-10-CM

## 2024-08-20 PROCEDURE — OTHER COUNSELING: OTHER

## 2024-08-20 PROCEDURE — OTHER SUTURE REMOVAL (GLOBAL PERIOD): OTHER

## 2024-08-20 ASSESSMENT — LOCATION ZONE DERM: LOCATION ZONE: SCALP

## 2024-08-20 ASSESSMENT — LOCATION SIMPLE DESCRIPTION DERM: LOCATION SIMPLE: SCALP

## 2024-08-20 ASSESSMENT — LOCATION DETAILED DESCRIPTION DERM: LOCATION DETAILED: LEFT CENTRAL FRONTAL SCALP

## 2024-08-20 NOTE — PROCEDURE: SUTURE REMOVAL (GLOBAL PERIOD)
Detail Level: Detailed
Add 61754 Cpt? (Important Note: In 2017 The Use Of 53079 Is Being Tracked By Cms To Determine Future Global Period Reimbursement For Global Periods): no

## 2025-07-19 ENCOUNTER — HEALTH MAINTENANCE LETTER (OUTPATIENT)
Age: 55
End: 2025-07-19

## 2025-08-05 ENCOUNTER — APPOINTMENT (OUTPATIENT)
Dept: URBAN - METROPOLITAN AREA CLINIC 252 | Age: 55
Setting detail: DERMATOLOGY
End: 2025-08-06

## 2025-08-05 VITALS — HEIGHT: 72 IN | WEIGHT: 260 LBS | RESPIRATION RATE: 16 BRPM

## 2025-08-05 DIAGNOSIS — L91.8 OTHER HYPERTROPHIC DISORDERS OF THE SKIN: ICD-10-CM

## 2025-08-05 DIAGNOSIS — Z87.2 PERSONAL HISTORY OF DISEASES OF THE SKIN AND SUBCUTANEOUS TISSUE: ICD-10-CM

## 2025-08-05 DIAGNOSIS — D49.2 NEOPLASM OF UNSPECIFIED BEHAVIOR OF BONE, SOFT TISSUE, AND SKIN: ICD-10-CM

## 2025-08-05 DIAGNOSIS — Z71.89 OTHER SPECIFIED COUNSELING: ICD-10-CM

## 2025-08-05 DIAGNOSIS — D17 BENIGN LIPOMATOUS NEOPLASM: ICD-10-CM

## 2025-08-05 DIAGNOSIS — L57.8 OTHER SKIN CHANGES DUE TO CHRONIC EXPOSURE TO NONIONIZING RADIATION: ICD-10-CM

## 2025-08-05 DIAGNOSIS — D22 MELANOCYTIC NEVI: ICD-10-CM

## 2025-08-05 PROBLEM — D17.21 BENIGN LIPOMATOUS NEOPLASM OF SKIN AND SUBCUTANEOUS TISSUE OF RIGHT ARM: Status: ACTIVE | Noted: 2025-08-05

## 2025-08-05 PROBLEM — D22.5 MELANOCYTIC NEVI OF TRUNK: Status: ACTIVE | Noted: 2025-08-05

## 2025-08-05 PROBLEM — D23.72 OTHER BENIGN NEOPLASM OF SKIN OF LEFT LOWER LIMB, INCLUDING HIP: Status: ACTIVE | Noted: 2025-08-05

## 2025-08-05 PROBLEM — D17.1 BENIGN LIPOMATOUS NEOPLASM OF SKIN AND SUBCUTANEOUS TISSUE OF TRUNK: Status: ACTIVE | Noted: 2025-08-05

## 2025-08-05 PROCEDURE — OTHER COUNSELING: OTHER

## 2025-08-05 PROCEDURE — 99213 OFFICE O/P EST LOW 20 MIN: CPT | Mod: 25

## 2025-08-05 PROCEDURE — OTHER BIOPSY BY SHAVE METHOD: OTHER

## 2025-08-05 PROCEDURE — 11102 TANGNTL BX SKIN SINGLE LES: CPT

## 2025-08-05 ASSESSMENT — LOCATION SIMPLE DESCRIPTION DERM
LOCATION SIMPLE: LEFT ANTERIOR NECK
LOCATION SIMPLE: ABDOMEN
LOCATION SIMPLE: RIGHT SHOULDER
LOCATION SIMPLE: RIGHT CLAVICULAR SKIN
LOCATION SIMPLE: RIGHT UPPER BACK
LOCATION SIMPLE: LEFT SCALP
LOCATION SIMPLE: RIGHT AXILLARY VAULT
LOCATION SIMPLE: LEFT UPPER BACK

## 2025-08-05 ASSESSMENT — LOCATION ZONE DERM
LOCATION ZONE: ARM
LOCATION ZONE: SCALP
LOCATION ZONE: NECK
LOCATION ZONE: TRUNK
LOCATION ZONE: AXILLAE

## 2025-08-05 ASSESSMENT — LOCATION DETAILED DESCRIPTION DERM
LOCATION DETAILED: LEFT SUPERIOR LATERAL UPPER BACK
LOCATION DETAILED: LEFT CENTRAL FRONTAL SCALP
LOCATION DETAILED: PERIUMBILICAL SKIN
LOCATION DETAILED: RIGHT CLAVICULAR SKIN
LOCATION DETAILED: RIGHT AXILLARY VAULT
LOCATION DETAILED: LEFT CLAVICULAR NECK
LOCATION DETAILED: RIGHT LATERAL UPPER BACK
LOCATION DETAILED: RIGHT POSTERIOR SHOULDER